# Patient Record
Sex: MALE | Race: WHITE | NOT HISPANIC OR LATINO | Employment: OTHER | URBAN - METROPOLITAN AREA
[De-identification: names, ages, dates, MRNs, and addresses within clinical notes are randomized per-mention and may not be internally consistent; named-entity substitution may affect disease eponyms.]

---

## 2017-03-15 ENCOUNTER — HOSPITAL ENCOUNTER (OUTPATIENT)
Dept: SLEEP MEDICINE | Facility: HOSPITAL | Age: 52
Discharge: HOME | End: 2017-03-15
Attending: FAMILY MEDICINE
Payer: COMMERCIAL

## 2017-03-15 PROCEDURE — 95810 POLYSOM 6/> YRS 4/> PARAM: CPT

## 2017-05-25 ENCOUNTER — HOSPITAL ENCOUNTER (OUTPATIENT)
Dept: SLEEP MEDICINE | Facility: HOSPITAL | Age: 52
Discharge: HOME | End: 2017-05-25
Attending: FAMILY MEDICINE
Payer: COMMERCIAL

## 2017-05-25 PROCEDURE — 95811 POLYSOM 6/>YRS CPAP 4/> PARM: CPT

## 2017-12-18 ENCOUNTER — OFFICE VISIT (OUTPATIENT)
Dept: NEUROLOGY | Facility: CLINIC | Age: 52
End: 2017-12-18
Payer: MEDICARE

## 2017-12-18 VITALS
DIASTOLIC BLOOD PRESSURE: 78 MMHG | WEIGHT: 147.25 LBS | BODY MASS INDEX: 25.14 KG/M2 | HEART RATE: 84 BPM | SYSTOLIC BLOOD PRESSURE: 122 MMHG | RESPIRATION RATE: 14 BRPM | HEIGHT: 64 IN

## 2017-12-18 DIAGNOSIS — M77.8 LEFT ELBOW TENDINITIS: ICD-10-CM

## 2017-12-18 DIAGNOSIS — M43.6 TORTICOLLIS: ICD-10-CM

## 2017-12-18 DIAGNOSIS — R25.1 TREMOR: ICD-10-CM

## 2017-12-18 DIAGNOSIS — G24.3 CERVICAL DYSTONIA: Primary | ICD-10-CM

## 2017-12-18 DIAGNOSIS — R93.89 ABNORMAL MRI: ICD-10-CM

## 2017-12-18 PROCEDURE — 99213 OFFICE O/P EST LOW 20 MIN: CPT | Mod: PBBFAC | Performed by: PSYCHIATRY & NEUROLOGY

## 2017-12-18 PROCEDURE — 99999 PR PBB SHADOW E&M-EST. PATIENT-LVL III: CPT | Mod: PBBFAC,,, | Performed by: PSYCHIATRY & NEUROLOGY

## 2017-12-18 PROCEDURE — 99999 PR STA SHADOW: CPT | Mod: PBBFAC,,, | Performed by: PSYCHIATRY & NEUROLOGY

## 2017-12-18 PROCEDURE — 99214 OFFICE O/P EST MOD 30 MIN: CPT | Mod: S$PBB | Performed by: PSYCHIATRY & NEUROLOGY

## 2017-12-18 RX ORDER — WARFARIN 4 MG/1
8 TABLET ORAL DAILY
COMMUNITY
End: 2020-07-21

## 2017-12-18 RX ORDER — ARIPIPRAZOLE 10 MG/1
5 TABLET ORAL DAILY
COMMUNITY

## 2017-12-18 RX ORDER — PRAMIPEXOLE DIHYDROCHLORIDE 0.12 MG/1
0.25 TABLET ORAL NIGHTLY
COMMUNITY

## 2017-12-18 RX ORDER — HYDROCODONE BITARTRATE AND ACETAMINOPHEN 7.5; 325 MG/1; MG/1
1 TABLET ORAL EVERY 6 HOURS PRN
COMMUNITY

## 2017-12-18 RX ORDER — MELOXICAM 7.5 MG/1
7.5 TABLET ORAL DAILY
Qty: 30 TABLET | Refills: 0 | Status: SHIPPED | OUTPATIENT
Start: 2017-12-18

## 2017-12-18 NOTE — PROGRESS NOTES
HPI:Alexis Turner is a 52 y.o. male  with complex regional pain syndrome and severe cervical dystonia with right torticollisis. He also had tremor noted prior.  Some tilting of the vision, which he has had prior, likely related to his extreme head positioning. In 2012 he had  left foot tingling (last 3 toes) with lower back/ tailbone pain and soreness in the legs with L spine xray showing arthritic changes and 2012 EMG showed  Left S1 Radiculopathy with probably less obvious Radiculopathy at L5 as well    Patient has not been seen for Botox since 6/2016. He does want to establish care for Botox for cervical dystonia again.  Patient states he saw an opthalmologist in [place] due to headaches. Opthalmologist thought eye exam was normal. PCP had performed MRI brain. Patient's vision is good. He states he has some headaches which is improved. Most headache seem to be starting from the neck and radiating up which he believes is related to his usual cervical dystonia pain- with increased pain without botox for some time.   He was then recommended to be seen here due to abnormality on MRI which showed increased signal in the optic nerve.   Patient states his neck pain is increased with dystonia  He sees Dr Hilton CHEUNG other pain  Left elbow has been hurting with lifting and with certain positions. He did not injure self. He has been working with his Sister who is a physical therapist.   Tremor is about the same- not very bothersome.     Review of Systems   Constitutional: Negative for fever.   HENT: Negative for nosebleeds.    Eyes: Negative for double vision.   Respiratory: Negative for hemoptysis.    Cardiovascular: Negative for leg swelling.   Gastrointestinal: Negative for blood in stool.   Genitourinary: Negative for hematuria.   Musculoskeletal: Negative for falls.   Skin: Negative for rash.   Neurological: Positive for tremors.       Exam:  Gen Appearance, well developed/nourished in no apparent distress  CV: 2+  "distal pulses with no edema or swelling  Neuro:  MS: Awake, alert, Sustains attention. Recent/remote memory intact, Language is full to spontaneous speech/comprehension. Fund of Knowledge is full, mood is euthymic  CN: Optic discs are flat with normal vasculature, PERRL, Extraoccular movements and visual fields are full. Normal facial sensation and strength, Tongue and Palate are midline and strong. Shoulder Shrug symmetric and strong.  Motor: Normal bulk, tone, no abnormal movements but tremor of hands outstretched. guarding in the right hand and arm consistent with CRPS, 5/5/ bilateral upper extremity/ lower extremity strength with guarding of the right arm  Sensation: intact temp and vibration  Gait: normal stance, no ataxia  Exam is limited due to patient's heightened sensitivity to touch from RSD/allodynia but there is hypertrophy of all neck movements, severe right lateral toricollis and some mild decrease in range of motion since the last visit    Labs: 4/12 B12/ folate normal    Xray L spine 2012: ARTHRITIC CHANGES OF THE APOPHYSEAL   JOINTS AT THE LUMBOSACRAL JUNCTION  EMG/NCS 2012: Left S1 Radiculopathy with probably less obvious Radiculopathy at L5 as well. Somewhat limited study as above.      2012 Neuropsychological testing attributed his symptoms of memory loss likely to a psychiatric etiology    MRI brain 9/2017:  "high signal in the right optic nerve sheath questioning papilledema"  MRA non-contributoary.     Assessment/plan:  Alexis Turner is a 52 y.o. male  with complex regional pain syndrome and severe cervical dystonia with right torticollisis. He also had tremor noted prior.  Some tilting of the vision, which he has had prior, likely related to his extreme head positioning. In 2012 he had  left foot tingling (last 3 toes) with lower back/ tailbone pain and soreness in the legs with L spine xray showing arthritic changes and 2012 EMG showed  Left S1 Radiculopathy with probably less obvious " "Radiculopathy at L5 as well.  I recommend:    1. MRI brain 2017 (done for headaches which originate from the neck with his dystonia) shows "high signal in the right optic nerve sheath questioning papilledema." Patient currently does not have visual field defect, papilledema or risk for pseudotumor. Exam does not show papilledema. No further work up or treatment needed. Optic nerve exam is non-specific finding. Eye exam recently normal per patient- no further work up needed  Headaches are likely related to cervical dystonia. Start Botox back at 300mg daily for dystonia. NOTE USE OF COUMADIN- he tolerated injections with this prior.   2. He has symptoms of left elbow tendinitis. Try Mobic for one month. If not better, will need orthopedist referral. Cautioned re: GI upset, renal, CV risk.   3. Watch prior tremor with time.  4. Patient has chronic radicular pain managed by Dr Canela, pain management  5. 2012 Neuropsychological testing attributed his symptoms of memory loss likely to a psychiatric etiology    RTC for botox  "

## 2018-01-16 DIAGNOSIS — M25.522 LEFT ELBOW PAIN: Primary | ICD-10-CM

## 2018-01-16 RX ORDER — MELOXICAM 7.5 MG/1
TABLET ORAL
Qty: 30 TABLET | Refills: 5 | OUTPATIENT
Start: 2018-01-16

## 2018-01-16 NOTE — TELEPHONE ENCOUNTER
I received a request for refill on Alexis's Mobic. Please notify him that he can't remain on this medication long term.  I will refer him to orthopedist instead for his elbow pain per orders

## 2018-01-17 NOTE — TELEPHONE ENCOUNTER
Tried calling for patient, unable to reach. Has appointment Friday for Botox will discuss then about medication and orthopedist.

## 2018-01-19 ENCOUNTER — PROCEDURE VISIT (OUTPATIENT)
Dept: NEUROLOGY | Facility: CLINIC | Age: 53
End: 2018-01-19
Payer: COMMERCIAL

## 2018-01-19 DIAGNOSIS — M43.6 TORTICOLLIS: ICD-10-CM

## 2018-01-19 DIAGNOSIS — G24.3 CERVICAL DYSTONIA: Primary | ICD-10-CM

## 2018-01-19 PROCEDURE — 64616 CHEMODENERV MUSC NECK DYSTON: CPT | Mod: 50,S$GLB,, | Performed by: PSYCHIATRY & NEUROLOGY

## 2018-01-19 NOTE — PROCEDURES
Procedures       Date of Procedure:1/19/18/    Reason for Procedure: cervical dystonia with torticollis     BOTOX PROCEDURE NOTE    Informed consent was obtained prior to performing this study. Two patient identifiers were confirmed with the patient prior to performing this study. A time out to determine correct patient and  agreement on procedure performed was conducted prior to the injections.    Patient is on Eloquis for Factov 5 disorder. He is aware of increased bleeding risk with injection as outlined prior.      Procedure Details: After informed consent was obtained, the patient's upper neck was cleansed with alcohol rub and 300 Units of Botox (diluted 1:1) was injected in the following bilateral muscles:   5 units in each spelinus capitis*, 10 units in the left levator scapulae and 110 units in the right levator scapulae* (over 5 sites), 20units in the right sterocleidomastoid and 30 units in the left sterocleidomastoid* (over 3 sites), 100 units in the right trapezius and 10 units in the left trapezius* (over 4 sites). 10 units in the right latissimus muscle due to complaint of twisting of the torso to the left with certain positions.   *= denotes bilateral injections   The patient tolerated the procedure well with no more than 0.25 cc blood loss. He was observed for several minutes post injection and given a handout from [employer] regarding when and where to seek help if side effects are experienced.        Medications used: botulinum toxin 300 units diluted 1:1 with normal saline used to dilute the botox    The patient did tolerate the procedure well. There were no complications.      He states his elbow tendinitis symptoms improved/ declines ortho consult.  Anticipating headaches should improve with Botox use as prior- will monitor .  Patient notes tremor which predates his abilify use. Abilify being prescribed by PCP for depression and dose recently increased. We discussed this tremor as an association  of dystonia, but dystonia and tremor could also be worsened by Abilify use- he will monitor for any worsening.    RTC in 3 or more months for more Botox

## 2018-01-25 RX ORDER — MELOXICAM 7.5 MG/1
TABLET ORAL
Qty: 30 TABLET | Refills: 0 | OUTPATIENT
Start: 2018-01-25

## 2018-05-04 ENCOUNTER — PROCEDURE VISIT (OUTPATIENT)
Dept: NEUROLOGY | Facility: CLINIC | Age: 53
End: 2018-05-04
Payer: COMMERCIAL

## 2018-05-04 DIAGNOSIS — R25.1 TREMOR: ICD-10-CM

## 2018-05-04 DIAGNOSIS — G24.3 CERVICAL DYSTONIA: Primary | ICD-10-CM

## 2018-05-04 DIAGNOSIS — M43.6 TORTICOLLIS: ICD-10-CM

## 2018-05-04 DIAGNOSIS — G24.3 ISOLATED CERVICAL DYSTONIA: ICD-10-CM

## 2018-05-04 PROCEDURE — 64616 CHEMODENERV MUSC NECK DYSTON: CPT | Mod: 50,S$GLB,, | Performed by: PSYCHIATRY & NEUROLOGY

## 2018-05-04 NOTE — PROCEDURES
Procedures     5/4/18    Reason for Procedure: cervical dystonia with torticollis      BOTOX PROCEDURE NOTE    Informed consent was obtained prior to performing this study. Two patient identifiers were confirmed with the patient prior to performing this study. A time out to determine correct patient and  agreement on procedure performed was conducted prior to the injections.    Patient is on Eloquis for Factov 5 disorder. He is aware of increased bleeding risk with injection as outlined prior.      Procedure Details: After informed consent was obtained, the patient's upper neck was cleansed with alcohol rub and 300 Units of Botox (diluted 1:1) was injected in the following bilateral muscles:   5 units in each spelinus capitis*, 10 units in the left levator scapulae and 110 units in the right levator scapulae* (over 5 sites), 20units in the right sterocleidomastoid and 30 units in the left sterocleidomastoid* (over 3 sites), 100 units in the right trapezius and 10 units in the left trapezius* (over 4 sites). 10 units in the right latissimus muscle due to complaint of twisting of the torso to the left with certain positions.   *= denotes bilateral injections   The patient tolerated the procedure well with no more than 0.25 cc blood loss. He was observed for several minutes post injection and given a handout from [employer] regarding when and where to seek help if side effects are experienced.          Medications used: botulinum toxin 300 units diluted 1:1 with normal saline used to dilute the botox    The patient did tolerate the procedure well. There were no complications.         Headaches improved greatly as expected post Botox  Patient notes tremor which predates his abilify use but is improved now. Abilify being prescribed by PCP for depression. We reviewed that this tremor as an association of dystonia, but dystonia and tremor could also be worsened by Abilify use- he will monitor for any worsening.     RTC in 3  or more months for more Botox

## 2018-07-26 ENCOUNTER — PROCEDURE VISIT (OUTPATIENT)
Dept: NEUROLOGY | Facility: CLINIC | Age: 53
End: 2018-07-26
Payer: COMMERCIAL

## 2018-07-26 DIAGNOSIS — M43.6 TORTICOLLIS: ICD-10-CM

## 2018-07-26 DIAGNOSIS — G24.3 CERVICAL DYSTONIA: Primary | ICD-10-CM

## 2018-07-26 DIAGNOSIS — R25.1 TREMOR: ICD-10-CM

## 2018-07-26 PROCEDURE — 64616 CHEMODENERV MUSC NECK DYSTON: CPT | Mod: 50,S$GLB,, | Performed by: PSYCHIATRY & NEUROLOGY

## 2018-07-26 NOTE — PROCEDURES
Procedures        Date of procedure: 7/26/18    Reason for Procedure: cervical dystonia with torticollis      BOTOX PROCEDURE NOTE    Informed consent was obtained prior to performing this study. Two patient identifiers were confirmed with the patient prior to performing this study. A time out to determine correct patient and  agreement on procedure performed was conducted prior to the injections.    Patient is on Eloquis for Factov 5 disorder. He is aware of increased bleeding risk with injection as outlined prior.      Procedure Details: After informed consent was obtained, the patient's upper neck was cleansed with alcohol rub and 300 Units of Botox (diluted 1:1) was injected in the following bilateral muscles:   5 units in each spelinus capitis*, 10 units in the left levator scapulae and 110 units in the right levator scapulae* (over 5 sites), 20units in the right sterocleidomastoid and 30 units in the left sterocleidomastoid* (over 3 sites), 100 units in the right trapezius and 10 units in the left trapezius* (over 4 sites). 10 units in the right latissimus muscle due to complaint of twisting of the torso to the left with certain positions.   *= denotes bilateral injections   The patient tolerated the procedure well with no more than 0.25 cc blood loss. He was observed for several minutes post injection and given a handout from [employer] regarding when and where to seek help if side effects are experienced.          Medications used: botulinum toxin 300 units diluted 1:1 with normal saline used to dilute the botox    The patient did tolerate the procedure well. There were no complications.          Headaches improved with Botox as prior  Patient notes tremor which predates his abilify use over time.  Abilify is being prescribed by PCP for depression. He is aware that tremor is an association of dystonia, but tremor is likely currently more obvious due to Abilify use- he states he will discuss Abilify treatment  and tremor with PCP. Could refer to psychiatry if needed at any point otherwise     RTC in 3 or more months for more Botox      CC: Dr Orellana

## 2019-01-10 ENCOUNTER — PROCEDURE VISIT (OUTPATIENT)
Dept: NEUROLOGY | Facility: CLINIC | Age: 54
End: 2019-01-10
Payer: MEDICARE

## 2019-01-10 DIAGNOSIS — G24.3 CERVICAL DYSTONIA: Primary | ICD-10-CM

## 2019-01-10 PROCEDURE — 64616 PR CHEMODENERVATION NECK MUSCLES EXC LARNYNX, UNI: ICD-10-PCS | Mod: 50,S$GLB,, | Performed by: PSYCHIATRY & NEUROLOGY

## 2019-01-10 PROCEDURE — 64616 CHEMODENERV MUSC NECK DYSTON: CPT | Mod: 50,S$GLB,, | Performed by: PSYCHIATRY & NEUROLOGY

## 2019-01-10 NOTE — PROCEDURES
Date of procedure: 1/10/18    Reason for Procedure: cervical dystonia with torticollis      BOTOX PROCEDURE NOTE    Informed consent was obtained prior to performing this study. Two patient identifiers were confirmed with the patient prior to performing this study. A time out to determine correct patient and  agreement on procedure performed was conducted prior to the injections.    Patient is on anticoagulation for Factov 5 disorder. He is aware of increased bleeding risk with injection as outlined prior.      Procedure Details: After informed consent was obtained, the patient's upper neck was cleansed with alcohol rub and 300 Units of Botox (diluted 1:1) was injected in the following bilateral muscles:   5 units in each spelinus capitis*, 10 units in the left levator scapulae and 110 units in the right levator scapulae* (over 5 sites), 20units in the right sterocleidomastoid and 30 units in the left sterocleidomastoid* (over 3 sites), 100 units in the right trapezius and 10 units in the left trapezius* (over 4 sites). 10 units in the right latissimus muscle due to complaint of twisting of the torso to the left with certain positions.   *= denotes bilateral injections   The patient tolerated the procedure well with no more than 0.25 cc blood loss. He was observed for several minutes post injection and given a handout from [employer] regarding when and where to seek help if side effects are experienced.          Medications used: botulinum toxin 300 units diluted 1:1 with normal saline used to dilute the botox    The patient did tolerate the procedure well. There were no complications.          Headaches improved with Botox as prior and dose cervical dystonia complaints  Patient notes tremor which predates his abilify use over time.  Abilify is now being monitored by psychiatry and he states he is improved with tremor and mood under this care. He is aware that tremor is an association of dystonia and abilify.   RTC in  3 or more months for more Botox       CC: Dr Orellana

## 2020-07-21 ENCOUNTER — OFFICE VISIT (OUTPATIENT)
Dept: NEUROLOGY | Facility: CLINIC | Age: 55
End: 2020-07-21
Payer: MEDICARE

## 2020-07-21 VITALS
RESPIRATION RATE: 18 BRPM | BODY MASS INDEX: 25.38 KG/M2 | HEIGHT: 64 IN | WEIGHT: 148.69 LBS | HEART RATE: 78 BPM | SYSTOLIC BLOOD PRESSURE: 154 MMHG | DIASTOLIC BLOOD PRESSURE: 86 MMHG

## 2020-07-21 DIAGNOSIS — R41.3 MEMORY LOSS: ICD-10-CM

## 2020-07-21 DIAGNOSIS — G89.29 OTHER CHRONIC PAIN: ICD-10-CM

## 2020-07-21 DIAGNOSIS — F99 PSYCHIATRIC DISORDER: ICD-10-CM

## 2020-07-21 DIAGNOSIS — G24.3 ISOLATED CERVICAL DYSTONIA: Primary | ICD-10-CM

## 2020-07-21 PROCEDURE — 3008F PR BODY MASS INDEX (BMI) DOCUMENTED: ICD-10-PCS | Mod: CPTII,S$GLB,, | Performed by: NURSE PRACTITIONER

## 2020-07-21 PROCEDURE — 99214 OFFICE O/P EST MOD 30 MIN: CPT | Mod: S$GLB,,, | Performed by: NURSE PRACTITIONER

## 2020-07-21 PROCEDURE — 99214 PR OFFICE/OUTPT VISIT, EST, LEVL IV, 30-39 MIN: ICD-10-PCS | Mod: S$GLB,,, | Performed by: NURSE PRACTITIONER

## 2020-07-21 PROCEDURE — 99999 PR PBB SHADOW E&M-EST. PATIENT-LVL IV: ICD-10-PCS | Mod: PBBFAC,,, | Performed by: NURSE PRACTITIONER

## 2020-07-21 PROCEDURE — 99999 PR PBB SHADOW E&M-EST. PATIENT-LVL IV: CPT | Mod: PBBFAC,,, | Performed by: NURSE PRACTITIONER

## 2020-07-21 PROCEDURE — 3008F BODY MASS INDEX DOCD: CPT | Mod: CPTII,S$GLB,, | Performed by: NURSE PRACTITIONER

## 2020-07-21 RX ORDER — CIPROFLOXACIN 500 MG/1
TABLET ORAL
COMMUNITY
Start: 2020-07-20

## 2020-07-21 RX ORDER — HYDROCHLOROTHIAZIDE 25 MG/1
TABLET ORAL
COMMUNITY
Start: 2020-07-21

## 2020-07-21 RX ORDER — WARFARIN 10 MG/1
TABLET ORAL
COMMUNITY
Start: 2020-07-10

## 2020-07-21 RX ORDER — AMLODIPINE BESYLATE 5 MG/1
TABLET ORAL
COMMUNITY
Start: 2020-07-12

## 2020-07-21 RX ORDER — TRAZODONE HYDROCHLORIDE 150 MG/1
TABLET ORAL
COMMUNITY
Start: 2020-07-11

## 2020-07-21 RX ORDER — BUPROPION HYDROCHLORIDE 300 MG/1
TABLET ORAL
COMMUNITY
Start: 2020-07-11

## 2020-07-21 RX ORDER — WARFARIN 1 MG/1
TABLET ORAL
COMMUNITY

## 2020-07-21 NOTE — PROGRESS NOTES
"Subjective:      Chief Complaint:  Consult and Med Change      History of Present Illness  Alexis Turner is a 54 y.o. male with complex regional pain syndrome and severe cervical dystonia with right torticollisis. He also had tremor noted prior. Some tilting of the vision, which he has had prior, likely related to his extreme head positioning. In 2012 he had  left foot tingling (last 3 toes) with lower back/ tailbone pain and soreness in the legs with L spine xray showing arthritic changes and 2012 EMG showed  Left S1 Radiculopathy with probably less obvious Radiculopathy at L5 as well.     He presents today to reestablish care for complaint of memory loss per his referral, but he states that he was sent here by PCP to "get Klonopin". He was referred by Pamela Hull, IDALIA/PCP. MRI Brain done at [facility], which shows chronic small vessel changes/cerebral atherosclerosis, which was noted on prior exam as well. Patient reports that labs were done by PCP, but there is no record of this per [facility].     He got off of Klonopin a few months ago per patient, as Dr. Orellana told him that he could no longer prescribe this with his Hydrocodone. He states that his PCP told him that he needed to see Neurology to obtain Klonopin. Per , this has not been prescribed since 10/2019.     He has agitation, anxiety/depression.     He refuses to see Psychiatry, as he saw one in the past, and this was not helpful. He does not want to "bring up his past". He is on four separate medications for mood-Abilify, Wellbutrin, Cymbalta, and Abilify. He feels that he needs Klonopin to "bring him down". He states repetitively during his interview that he "is not here for drugs", but continues to report that PCP wants him to obtain Klonopin from this clinic.     He drives, without issue. He cooks, but does not burn anything.     He has not had Botox for cervical dystonia in this clinic since 1/2019. Pain is managed by PCP per " patient.     I have reviewed all of this patient's past medical and surgical histories as well as family and social histories and active allergies and medications as documented in the electronic medical record.    Review of Systems  Review of Systems   Constitutional: Negative for activity change, appetite change, fatigue, fever and unexpected weight change.   HENT: Negative for congestion, drooling, ear pain, hearing loss, mouth sores, sinus pressure, tinnitus, trouble swallowing and voice change.    Eyes: Negative.  Negative for photophobia and visual disturbance.         No Blurred vision   Respiratory: Negative for apnea, choking and shortness of breath.    Cardiovascular: Negative for chest pain, palpitations and leg swelling.   Gastrointestinal: Negative for constipation, diarrhea, nausea and vomiting.   Genitourinary: Negative for dysuria.   Musculoskeletal: Positive for neck pain. Negative for arthralgias, back pain, gait problem, joint swelling, myalgias and neck stiffness.   Skin: Negative for rash.   Neurological: Negative for dizziness, tremors, seizures, syncope, facial asymmetry, speech difficulty, weakness, light-headedness, numbness and headaches.   Hematological: Does not bruise/bleed easily.   Psychiatric/Behavioral: Positive for agitation, decreased concentration, dysphoric mood and sleep disturbance. Negative for behavioral problems, confusion, hallucinations and suicidal ideas. The patient is nervous/anxious.         Memory loss       Objective:       Vitals:    07/21/20 1313   BP: (!) 154/86   Pulse: 78   Resp: 18       Exam:  Gen Appearance, well developed/moves quickly around in his chair/fidgeting/tapping noted throughout exam  CV: 2+ distal pulses with no edema or swelling  Neuro:  MS: Awake, alert, trouble sustaining attention. Recent memory seems mildly impaired/remote memory intact, Language is full to spontaneous speech/comprehension-speech is rapid. Fund of Knowledge is full, mood is  "hyper  CN: Optic discs are flat with normal vasculature, PERRL, Extraoccular movements and visual fields are full. Normal facial sensation and strength, Tongue and Palate are midline and strong. Shoulder Shrug symmetric and strong. Able to name current President easily.   Motor: Normal bulk, tone, no abnormal movements but tremor of hands outstretched. guarding in the right hand and arm consistent with CRPS, 5/5/ bilateral upper extremity/ lower extremity strength with guarding of the right arm  Sensation: intact temp and vibration  Gait: normal stance, no ataxia  Exam is limited due to patient's heightened sensitivity to touch from RSD/allodynia but there is hypertrophy of all neck movements, severe right lateral toricollis and some mild decrease in range of motion since the last visit.     Labs:   4/12 B12/ folate normal     Imaging:   Xray L spine 2012: ARTHRITIC CHANGES OF THE APOPHYSEAL   JOINTS AT THE LUMBOSACRAL JUNCTION  EMG/NCS 2012: Left S1 Radiculopathy with probably less obvious Radiculopathy at L5 as well. Somewhat limited study as above.      MRI Brain 9/2017:  "high signal in the right optic nerve sheath questioning papilledema"  MRA non-contributoary.     MRI Brain 7/2020:   No acute IC process  Chronic small vessel ischemia      2012 Neuropsychological testing attributed his symptoms of memory loss likely to a psychiatric etiology  Assessment:   Alexis Turner is a 54 y.o. male with complex regional pain syndrome and severe cervical dystonia with right torticollisis. He also had tremor noted prior.  Some tilting of the vision, which he has had prior, likely related to his extreme head positioning. In 2012 he had  left foot tingling (last 3 toes) with lower back/ tailbone pain and soreness in the legs with L spine xray showing arthritic changes and 2012 EMG showed  Left S1 Radiculopathy with probably less obvious Radiculopathy at L5 as well.  Plan:      I recommend:   1. Repeat Neuropsych testing " "would not be valid at this time, given the severity of his unmanaged mood issues, which would confound his results.   -I would consider repeat testing once his mood issues are better controlled. Discussed that unmanaged anxiety/depression can contribute to memory loss.   -No signs of executive dysfunction at this time to suggest dementia.   -He has cerebral atherosclerosis, possibly from his history of HTN.   -2012 Neuropsych testing suggested that his mood disorder was the cause of his memory complaints.   2. I advised him to consult with Psychiatry, given the severity of his mood issues on four separate medications, including Cymbalta, Wellbutrin, Trazodone, and Abilify-he absolutely refuses, as he saw one prior, and does not feel that this would be helpful.   3. I informed him that this clinic would NOT be prescribing him Klonopin.   4. As he has refused Psychiatry referral, I advise that he follow up with his PCP to discuss his ongoing mood complaints.   5. TSH, CBC, CMP, serum drug screen, B12, folate, RPR.   6. MRI Brain 2017 (done for headaches which originate from the neck with his dystonia) shows "high signal in the right optic nerve sheath questioning papilledema." Patient currently does not have visual field defect, papilledema or risk for pseudotumor. Exam does not show papilledema. No further work up or treatment needed. Optic nerve exam is non-specific finding. Eye exam recently normal per patient- no further work up needed.  7. He had Botox for cervical dystonia prior.   -He takes Baclofen for spasm now per PCP.   -NOTE USE OF COUMADIN- he tolerated injections with this prior.   8. He had symptoms of left elbow tendinitis prior.   Cautioned re: GI upset, renal, CV risk.   9. Watch prior tremor with time.  10. Patient has chronic radicular pain managed by PCP, and previously, Dr. Canela.     Will call to discuss  lab results/determine what further testing is needed.     CC: Dr. Mukul Orellana/Pamela " "IDALIA Hull    "note will not be shared"                      "

## 2020-07-22 ENCOUNTER — TELEPHONE (OUTPATIENT)
Dept: NEUROLOGY | Facility: CLINIC | Age: 55
End: 2020-07-22

## 2020-07-22 NOTE — TELEPHONE ENCOUNTER
Unable to reach patient by phone or leave a message. Lab orders have been faxed to [facility].

## 2020-07-22 NOTE — TELEPHONE ENCOUNTER
Labs ordered after patient's visit yesterday. Labs will evaluate for other causes of his memory loss; however, memory loss is suspected to be related to anxiety/mood issues at this time. Will forward labs to his PCP once complete.

## 2020-07-24 NOTE — TELEPHONE ENCOUNTER
Labs received from Dr Murcia office. Only PT/INR and Thyroid panel done. Patient notified that he should report to [facility] for labs ordered by Jacqueline. Patient able to verbalize understanding.

## 2020-07-24 NOTE — TELEPHONE ENCOUNTER
Spoke with patient who states that he recently had full lab work up with Dr Mukul Orellana 1 week ago. I asked that he contact that office and have them fax over results so we can compare to new lab orders. Patient given clinic fax and number and able to verbal understanding.

## 2020-08-13 ENCOUNTER — TELEPHONE (OUTPATIENT)
Dept: NEUROLOGY | Facility: CLINIC | Age: 55
End: 2020-08-13

## 2020-08-13 NOTE — TELEPHONE ENCOUNTER
----- Message from Michelle Tyler sent at 2020  9:59 AM CDT -----  Regarding: JacquelinePIPER  Alexis Turner  MRN: [id number]  : 1965  PCP: Mukul Orellana  Home Phone      [phone]  Work Phone      Not on file.  Mobile          [phone]      MESSAGE:    Jacqueline with [facility] states they need the patients medical necessity for labs paperwork faxed to [phone], phone [phone]. Thanks.

## 2020-08-19 ENCOUNTER — TELEPHONE (OUTPATIENT)
Dept: NEUROLOGY | Facility: CLINIC | Age: 55
End: 2020-08-19

## 2020-08-19 NOTE — TELEPHONE ENCOUNTER
[facility] [employer] contacted to inform us Urine drug screen and RPR is not being covered under diagnosis of R41.3. They are asking for an added diagnosis for this to be covered under insurance.       Please fax to [phone]

## 2020-08-20 NOTE — TELEPHONE ENCOUNTER
"Considering that this diagnosis code, which is for "other amnesia" was not even used during his visit, I'm not sure what the issue is. These labs were ordered under memory loss, which is 780.93  "